# Patient Record
Sex: MALE | Race: WHITE | Employment: FULL TIME | URBAN - METROPOLITAN AREA
[De-identification: names, ages, dates, MRNs, and addresses within clinical notes are randomized per-mention and may not be internally consistent; named-entity substitution may affect disease eponyms.]

---

## 2018-11-30 ENCOUNTER — OFFICE VISIT (OUTPATIENT)
Dept: FAMILY MEDICINE CLINIC | Age: 53
End: 2018-11-30
Payer: COMMERCIAL

## 2018-11-30 VITALS
HEART RATE: 74 BPM | SYSTOLIC BLOOD PRESSURE: 92 MMHG | OXYGEN SATURATION: 98 % | WEIGHT: 159 LBS | DIASTOLIC BLOOD PRESSURE: 60 MMHG

## 2018-11-30 DIAGNOSIS — J37.0 LARYNGITIS, CHRONIC: Primary | ICD-10-CM

## 2018-11-30 PROCEDURE — 99213 OFFICE O/P EST LOW 20 MIN: CPT | Performed by: PHYSICIAN ASSISTANT

## 2018-11-30 ASSESSMENT — PATIENT HEALTH QUESTIONNAIRE - PHQ9
2. FEELING DOWN, DEPRESSED OR HOPELESS: 0
SUM OF ALL RESPONSES TO PHQ9 QUESTIONS 1 & 2: 0
1. LITTLE INTEREST OR PLEASURE IN DOING THINGS: 0
SUM OF ALL RESPONSES TO PHQ QUESTIONS 1-9: 0
SUM OF ALL RESPONSES TO PHQ QUESTIONS 1-9: 0

## 2018-11-30 ASSESSMENT — ENCOUNTER SYMPTOMS
EYE PAIN: 0
WHEEZING: 0
SORE THROAT: 0
VOICE CHANGE: 1
COUGH: 0

## 2019-07-12 ENCOUNTER — OFFICE VISIT (OUTPATIENT)
Dept: FAMILY MEDICINE CLINIC | Age: 54
End: 2019-07-12
Payer: COMMERCIAL

## 2019-07-12 VITALS
HEIGHT: 63 IN | DIASTOLIC BLOOD PRESSURE: 68 MMHG | WEIGHT: 157 LBS | HEART RATE: 64 BPM | BODY MASS INDEX: 27.82 KG/M2 | SYSTOLIC BLOOD PRESSURE: 110 MMHG | OXYGEN SATURATION: 98 %

## 2019-07-12 DIAGNOSIS — Z23 NEED FOR DIPHTHERIA-TETANUS-PERTUSSIS (TDAP) VACCINE: ICD-10-CM

## 2019-07-12 DIAGNOSIS — Z12.11 SCREEN FOR COLON CANCER: ICD-10-CM

## 2019-07-12 DIAGNOSIS — Z00.00 PREVENTATIVE HEALTH CARE: Primary | ICD-10-CM

## 2019-07-12 DIAGNOSIS — Z13.220 SCREENING FOR LIPID DISORDERS: ICD-10-CM

## 2019-07-12 DIAGNOSIS — Z12.5 SCREENING FOR PROSTATE CANCER: ICD-10-CM

## 2019-07-12 DIAGNOSIS — Z11.4 SCREENING FOR HIV (HUMAN IMMUNODEFICIENCY VIRUS): ICD-10-CM

## 2019-07-12 DIAGNOSIS — Z13.1 SCREENING FOR DIABETES MELLITUS: ICD-10-CM

## 2019-07-12 PROCEDURE — 90471 IMMUNIZATION ADMIN: CPT | Performed by: PHYSICIAN ASSISTANT

## 2019-07-12 PROCEDURE — 99396 PREV VISIT EST AGE 40-64: CPT | Performed by: PHYSICIAN ASSISTANT

## 2019-07-12 PROCEDURE — 90715 TDAP VACCINE 7 YRS/> IM: CPT | Performed by: PHYSICIAN ASSISTANT

## 2019-07-12 ASSESSMENT — PATIENT HEALTH QUESTIONNAIRE - PHQ9
SUM OF ALL RESPONSES TO PHQ QUESTIONS 1-9: 0
2. FEELING DOWN, DEPRESSED OR HOPELESS: 0
1. LITTLE INTEREST OR PLEASURE IN DOING THINGS: 0
SUM OF ALL RESPONSES TO PHQ QUESTIONS 1-9: 0
SUM OF ALL RESPONSES TO PHQ9 QUESTIONS 1 & 2: 0

## 2019-07-12 ASSESSMENT — ENCOUNTER SYMPTOMS
CONSTIPATION: 0
EYE PAIN: 0
TROUBLE SWALLOWING: 0
CHEST TIGHTNESS: 0
COUGH: 0
BACK PAIN: 0
VOICE CHANGE: 0
SORE THROAT: 0
SHORTNESS OF BREATH: 0
ABDOMINAL PAIN: 0
DIARRHEA: 0

## 2019-07-12 NOTE — PROGRESS NOTES
supple. No thyroid mass and no thyromegaly present. Cardiovascular: Normal rate, regular rhythm, S1 normal, S2 normal, normal heart sounds and normal pulses. PMI is not displaced. Pulmonary/Chest: Effort normal and breath sounds normal. He has no decreased breath sounds. He has no wheezes. He has no rhonchi. He has no rales. Abdominal: Soft. Normal appearance and bowel sounds are normal. There is no hepatosplenomegaly. There is no tenderness. There is no rebound, no guarding and no CVA tenderness. No hernia. Musculoskeletal: Normal range of motion. Cervical back: Normal.        Thoracic back: Normal.        Lumbar back: Normal.   Lymphadenopathy:     He has no cervical adenopathy. Neurological: He is alert and oriented to person, place, and time. He has normal strength. No cranial nerve deficit or sensory deficit. Gait normal.   Skin: Skin is warm, dry and intact. No rash noted. No cyanosis. Psychiatric: He has a normal mood and affect. His speech is normal and behavior is normal.       Assessment:      [facility] was seen today for annual exam.    Diagnoses and all orders for this visit:    Preventative health care    Need for diphtheria-tetanus-pertussis (Tdap) vaccine  -     Tdap (age 6y and older) IM ([address])    Screen for colon cancer  -     COLONOSCOPY (Screening)    Screening for lipid disorders  -     LIPID PANEL; Future    Screening for diabetes mellitus  -     Comprehensive Metabolic Panel    Screening for prostate cancer  -     Psa screening    Screening for HIV (human immunodeficiency virus)  -     HIV-1 AND HIV-2 ANTIBODIES; Future             Plan:      Get routine labs, colonoscopy, eye exam, tdap today, return in a year.         Jaquelin Grant

## 2019-08-12 ENCOUNTER — TELEPHONE (OUTPATIENT)
Dept: FAMILY MEDICINE CLINIC | Age: 54
End: 2019-08-12

## 2019-08-12 NOTE — TELEPHONE ENCOUNTER
LDL just a little high. Pt needs to cut down on fatty foods. Other labs are fine. Left message for patient to call back.

## 2019-10-01 ENCOUNTER — ANESTHESIA EVENT (OUTPATIENT)
Dept: ENDOSCOPY | Age: 54
End: 2019-10-01
Payer: COMMERCIAL

## 2019-10-25 ENCOUNTER — ANESTHESIA (OUTPATIENT)
Dept: ENDOSCOPY | Age: 54
End: 2019-10-25
Payer: COMMERCIAL

## 2019-10-25 ENCOUNTER — HOSPITAL ENCOUNTER (OUTPATIENT)
Age: 54
Setting detail: OUTPATIENT SURGERY
Discharge: HOME | End: 2019-10-25
Attending: INTERNAL MEDICINE | Admitting: INTERNAL MEDICINE
Payer: COMMERCIAL

## 2019-10-25 VITALS
RESPIRATION RATE: 16 BRPM | TEMPERATURE: 97.8 F | SYSTOLIC BLOOD PRESSURE: 119 MMHG | WEIGHT: 155 LBS | DIASTOLIC BLOOD PRESSURE: 79 MMHG | BODY MASS INDEX: 27.46 KG/M2 | HEIGHT: 63 IN | HEART RATE: 52 BPM | OXYGEN SATURATION: 100 %

## 2019-10-25 VITALS — SYSTOLIC BLOOD PRESSURE: 93 MMHG | OXYGEN SATURATION: 100 % | DIASTOLIC BLOOD PRESSURE: 67 MMHG

## 2019-10-25 PROCEDURE — 2500000003 HC RX 250 WO HCPCS: Performed by: NURSE ANESTHETIST, CERTIFIED REGISTERED

## 2019-10-25 PROCEDURE — 3700000001 HC ADD 15 MINUTES (ANESTHESIA): Performed by: INTERNAL MEDICINE

## 2019-10-25 PROCEDURE — 2709999900 HC NON-CHARGEABLE SUPPLY: Performed by: INTERNAL MEDICINE

## 2019-10-25 PROCEDURE — 3700000000 HC ANESTHESIA ATTENDED CARE: Performed by: INTERNAL MEDICINE

## 2019-10-25 PROCEDURE — 3609010600 HC COLONOSCOPY POLYPECTOMY SNARE/COLD BIOPSY: Performed by: INTERNAL MEDICINE

## 2019-10-25 PROCEDURE — 6370000000 HC RX 637 (ALT 250 FOR IP): Performed by: INTERNAL MEDICINE

## 2019-10-25 PROCEDURE — 6360000002 HC RX W HCPCS: Performed by: NURSE ANESTHETIST, CERTIFIED REGISTERED

## 2019-10-25 PROCEDURE — 7100000011 HC PHASE II RECOVERY - ADDTL 15 MIN: Performed by: INTERNAL MEDICINE

## 2019-10-25 PROCEDURE — 7100000010 HC PHASE II RECOVERY - FIRST 15 MIN: Performed by: INTERNAL MEDICINE

## 2019-10-25 PROCEDURE — 2580000003 HC RX 258: Performed by: ANESTHESIOLOGY

## 2019-10-25 RX ORDER — LABETALOL HYDROCHLORIDE 5 MG/ML
5 INJECTION, SOLUTION INTRAVENOUS EVERY 10 MIN PRN
Status: DISCONTINUED | OUTPATIENT
Start: 2019-10-25 | End: 2019-10-25 | Stop reason: HOSPADM

## 2019-10-25 RX ORDER — SODIUM CHLORIDE 0.9 % (FLUSH) 0.9 %
10 SYRINGE (ML) INJECTION PRN
Status: DISCONTINUED | OUTPATIENT
Start: 2019-10-25 | End: 2019-10-25 | Stop reason: HOSPADM

## 2019-10-25 RX ORDER — PROMETHAZINE HYDROCHLORIDE 25 MG/ML
6.25 INJECTION, SOLUTION INTRAMUSCULAR; INTRAVENOUS
Status: DISCONTINUED | OUTPATIENT
Start: 2019-10-25 | End: 2019-10-25 | Stop reason: HOSPADM

## 2019-10-25 RX ORDER — PROPOFOL 10 MG/ML
INJECTION, EMULSION INTRAVENOUS PRN
Status: DISCONTINUED | OUTPATIENT
Start: 2019-10-25 | End: 2019-10-25 | Stop reason: SDUPTHER

## 2019-10-25 RX ORDER — LIDOCAINE HYDROCHLORIDE 10 MG/ML
1 INJECTION, SOLUTION EPIDURAL; INFILTRATION; INTRACAUDAL; PERINEURAL
Status: DISCONTINUED | OUTPATIENT
Start: 2019-10-25 | End: 2019-10-25 | Stop reason: HOSPADM

## 2019-10-25 RX ORDER — LIDOCAINE HYDROCHLORIDE 20 MG/ML
INJECTION, SOLUTION EPIDURAL; INFILTRATION; INTRACAUDAL; PERINEURAL PRN
Status: DISCONTINUED | OUTPATIENT
Start: 2019-10-25 | End: 2019-10-25 | Stop reason: SDUPTHER

## 2019-10-25 RX ORDER — SODIUM CHLORIDE 0.9 % (FLUSH) 0.9 %
10 SYRINGE (ML) INJECTION EVERY 12 HOURS SCHEDULED
Status: DISCONTINUED | OUTPATIENT
Start: 2019-10-25 | End: 2019-10-25 | Stop reason: HOSPADM

## 2019-10-25 RX ORDER — ONDANSETRON 2 MG/ML
4 INJECTION INTRAMUSCULAR; INTRAVENOUS
Status: DISCONTINUED | OUTPATIENT
Start: 2019-10-25 | End: 2019-10-25 | Stop reason: HOSPADM

## 2019-10-25 RX ORDER — SODIUM CHLORIDE 9 MG/ML
INJECTION, SOLUTION INTRAVENOUS CONTINUOUS
Status: DISCONTINUED | OUTPATIENT
Start: 2019-10-25 | End: 2019-10-25 | Stop reason: HOSPADM

## 2019-10-25 RX ADMIN — PROPOFOL 120 MG: 10 INJECTION, EMULSION INTRAVENOUS at 08:10

## 2019-10-25 RX ADMIN — PROPOFOL 40 MG: 10 INJECTION, EMULSION INTRAVENOUS at 08:17

## 2019-10-25 RX ADMIN — LIDOCAINE HYDROCHLORIDE 60 MG: 20 INJECTION, SOLUTION EPIDURAL; INFILTRATION; INTRACAUDAL; PERINEURAL at 08:10

## 2019-10-25 RX ADMIN — LIDOCAINE HYDROCHLORIDE 20 MG: 20 INJECTION, SOLUTION EPIDURAL; INFILTRATION; INTRACAUDAL; PERINEURAL at 08:13

## 2019-10-25 RX ADMIN — SODIUM CHLORIDE: 9 INJECTION, SOLUTION INTRAVENOUS at 07:18

## 2019-10-25 RX ADMIN — PROPOFOL 40 MG: 10 INJECTION, EMULSION INTRAVENOUS at 08:13

## 2019-10-25 ASSESSMENT — PAIN - FUNCTIONAL ASSESSMENT: PAIN_FUNCTIONAL_ASSESSMENT: 0-10

## 2019-10-25 ASSESSMENT — PAIN SCALES - GENERAL: PAINLEVEL_OUTOF10: 0

## 2020-06-04 ENCOUNTER — TELEPHONE (OUTPATIENT)
Dept: FAMILY MEDICINE CLINIC | Age: 55
End: 2020-06-04

## 2020-06-09 ENCOUNTER — TELEPHONE (OUTPATIENT)
Dept: FAMILY MEDICINE CLINIC | Age: 55
End: 2020-06-09

## 2020-06-16 ENCOUNTER — OFFICE VISIT (OUTPATIENT)
Dept: FAMILY MEDICINE CLINIC | Age: 55
End: 2020-06-16
Payer: COMMERCIAL

## 2020-06-16 VITALS
HEART RATE: 98 BPM | OXYGEN SATURATION: 98 % | WEIGHT: 160 LBS | HEIGHT: 63 IN | SYSTOLIC BLOOD PRESSURE: 112 MMHG | DIASTOLIC BLOOD PRESSURE: 84 MMHG | BODY MASS INDEX: 28.35 KG/M2 | TEMPERATURE: 97.7 F

## 2020-06-16 PROCEDURE — 93000 ELECTROCARDIOGRAM COMPLETE: CPT | Performed by: PHYSICIAN ASSISTANT

## 2020-06-16 PROCEDURE — 99213 OFFICE O/P EST LOW 20 MIN: CPT | Performed by: PHYSICIAN ASSISTANT

## 2020-06-16 RX ORDER — NAPROXEN 500 MG/1
500 TABLET ORAL 2 TIMES DAILY WITH MEALS
COMMUNITY
Start: 2015-04-18 | End: 2022-01-28

## 2020-06-16 ASSESSMENT — PATIENT HEALTH QUESTIONNAIRE - PHQ9
SUM OF ALL RESPONSES TO PHQ9 QUESTIONS 1 & 2: 0
SUM OF ALL RESPONSES TO PHQ QUESTIONS 1-9: 0
2. FEELING DOWN, DEPRESSED OR HOPELESS: 0
SUM OF ALL RESPONSES TO PHQ QUESTIONS 1-9: 0
1. LITTLE INTEREST OR PLEASURE IN DOING THINGS: 0

## 2020-06-16 NOTE — PROGRESS NOTES
Preoperative Consultation    Jerod Torres  YOB: 1965    This patient presents to the office today for a preoperative consultation at the request of surgeon, Dr. Sammy Gan, who plans on performing right shoulder arthroscopy on  at [facility].  He injured his right shoulder at work on 19. Was throwing a tv away and his arms were stretched out, it was a large tv. He went to urgent care first. Then he had to do PT. After 4 sessions, he said the pain was getting worse. He got an MRI and then saw orthopedics. Had rotator cuff tear and biceps tendonitis. It has taken this long due to Pitney Mathew. He has no significant medical problems that would hinder him from having surgery. He has had no recent illnesses. Planned anesthesia: General   Known anesthesia problems: None   Bleeding risk: No recent or remote history of abnormal bleeding  Personal or FH of DVT/PE: No      There is no problem list on file for this patient.     Past Surgical History:   Procedure Laterality Date    COLONOSCOPY N/A 10/25/2019    COLONOSCOPY POLYPECTOMY SNARE/COLD BIOPSY performed by Chary Morris MD at [address] [place] [address]       No Known Allergies  Outpatient Medications Marked as Taking for the 20 encounter (Office Visit) with JESIKA Wolff   Medication Sig Dispense Refill    naproxen (NAPROSYN) 500 MG tablet Take 500 mg by mouth 2 times daily (with meals)      Multiple Vitamins-Minerals (MULTIVITAMIN ADULT PO) Take 1 tablet by mouth daily         Social History     Tobacco Use    Smoking status: Former Smoker     Last attempt to quit: 1994     Years since quittin.4    Smokeless tobacco: Current User     Types: Snuff   Substance Use Topics    Alcohol use: No     Family History   Problem Relation Age of Onset    Diabetes Mother     Cancer Father         colon    Diabetes Father     Diabetes Brother        Review of Systems  A

## 2020-09-15 ENCOUNTER — OFFICE VISIT (OUTPATIENT)
Dept: FAMILY MEDICINE CLINIC | Age: 55
End: 2020-09-15
Payer: COMMERCIAL

## 2020-09-15 VITALS
TEMPERATURE: 98.1 F | HEART RATE: 66 BPM | SYSTOLIC BLOOD PRESSURE: 124 MMHG | BODY MASS INDEX: 29.16 KG/M2 | WEIGHT: 162 LBS | DIASTOLIC BLOOD PRESSURE: 81 MMHG

## 2020-09-15 PROCEDURE — 90471 IMMUNIZATION ADMIN: CPT | Performed by: PHYSICIAN ASSISTANT

## 2020-09-15 PROCEDURE — 90686 IIV4 VACC NO PRSV 0.5 ML IM: CPT | Performed by: PHYSICIAN ASSISTANT

## 2020-09-15 PROCEDURE — 99396 PREV VISIT EST AGE 40-64: CPT | Performed by: PHYSICIAN ASSISTANT

## 2020-09-15 RX ORDER — NAPROXEN 500 MG/1
500 TABLET ORAL 2 TIMES DAILY PRN
COMMUNITY
End: 2020-09-15

## 2020-09-15 RX ORDER — ACETAMINOPHEN 500 MG
500 TABLET ORAL EVERY 6 HOURS PRN
COMMUNITY
End: 2022-01-28 | Stop reason: ALTCHOICE

## 2020-09-15 RX ORDER — OXYCODONE HYDROCHLORIDE AND ACETAMINOPHEN 5; 325 MG/1; MG/1
TABLET ORAL
COMMUNITY
Start: 2020-06-29 | End: 2020-09-15 | Stop reason: ALTCHOICE

## 2020-09-15 ASSESSMENT — ENCOUNTER SYMPTOMS
TROUBLE SWALLOWING: 0
COUGH: 0
ABDOMINAL PAIN: 0
VOICE CHANGE: 0
CONSTIPATION: 0
EYE PAIN: 0
DIARRHEA: 0
SHORTNESS OF BREATH: 0
SORE THROAT: 0
BACK PAIN: 0
CHEST TIGHTNESS: 0

## 2020-09-15 NOTE — PROGRESS NOTES
Vaccine Information Sheet, \"Influenza - Inactivated\"  given to Soledad Bishop, or parent/legal guardian of  Soledad Bishop and verbalized understanding. Patient responses:    Have you ever had a reaction to a flu vaccine? No  Do you have any current illness? No  Have you ever had Guillian Vernon Syndrome? No  Do you have a serious allergy to any of the follow: Neomycin, Polymyxin, Thimerosal, eggs or egg products? No    Flu vaccine given per order. Please see immunization tab. Risks and benefits explained. Current VIS given.

## 2020-09-15 NOTE — PROGRESS NOTES
Subjective:      Patient ID: Brandi Meeks is a 50 y.o. male. HPI Patient is here today for his annual physical. No complaints. He had biceps tendon repair 6/29 and still doing PT. He has not had his labs done. He sleeps well, good appetite. He has no bowel/bladder issues. He does exercise daily with his job. He admits to an unhealthy. Tdap in 2019. He is current with dental exam. He does not have any eye issues. He would like a flu shot today. He is current with colonoscopy, due in 2029. Review of Systems   Constitutional: Negative for appetite change, fatigue and unexpected weight change. HENT: Negative for congestion, dental problem, ear pain, hearing loss, sore throat, trouble swallowing and voice change. Eyes: Negative for pain and visual disturbance. Respiratory: Negative for cough, chest tightness and shortness of breath. Cardiovascular: Negative for chest pain and palpitations. Gastrointestinal: Negative for abdominal pain, constipation and diarrhea. Genitourinary: Negative for difficulty urinating. Musculoskeletal: Negative for arthralgias, back pain, myalgias and neck pain. Skin: Negative for rash. Neurological: Negative for dizziness, speech difficulty, weakness, numbness and headaches. Hematological: Negative for adenopathy. Psychiatric/Behavioral: Negative for confusion and sleep disturbance. The patient is not nervous/anxious. Objective:   Physical Exam  Vitals signs reviewed. Constitutional:       Appearance: Normal appearance. He is well-developed and well-groomed. HENT:      Head: Normocephalic. Right Ear: Tympanic membrane and ear canal normal.      Left Ear: Tympanic membrane and ear canal normal.      Nose: Nose normal.      Mouth/Throat:      Pharynx: Oropharynx is clear. Uvula midline. Eyes:      Conjunctiva/sclera: Conjunctivae normal.      Pupils: Pupils are equal, round, and reactive to light.    Neck:      Musculoskeletal: Normal range of motion and neck supple. No muscular tenderness. Thyroid: No thyromegaly. Trachea: Trachea normal.   Cardiovascular:      Rate and Rhythm: Normal rate and regular rhythm. Chest Wall: PMI is not displaced. Pulses: Normal pulses. Heart sounds: Normal heart sounds. Pulmonary:      Effort: Pulmonary effort is normal.      Breath sounds: Normal breath sounds. Abdominal:      General: Abdomen is flat. Bowel sounds are normal.      Palpations: Abdomen is soft. Tenderness: There is no abdominal tenderness. There is no guarding or rebound. Hernia: No hernia is present. Musculoskeletal: Normal range of motion. Cervical back: Normal.      Thoracic back: Normal.      Lumbar back: Normal.      Comments: Other than right arm due to recent surgery   Lymphadenopathy:      Cervical: No cervical adenopathy. Skin:     General: Skin is warm and dry. Findings: No rash. Neurological:      Mental Status: He is alert and oriented to person, place, and time. Cranial Nerves: No cranial nerve deficit. Sensory: No sensory deficit. Gait: Gait normal.   Psychiatric:         Attention and Perception: Attention normal.         Mood and Affect: Mood normal.         Speech: Speech normal.         Behavior: Behavior normal. Behavior is cooperative. Assessment:      Eric Coffey was seen today for annual exam.    Diagnoses and all orders for this visit:    Preventative health care    Need for influenza vaccination  -     INFLUENZA, QUADV, 3 YRS AND OLDER, IM PF, PREFILL SYR OR SDV, 0.5ML (AFLURIA QUADV, PF)    Screening, lipid  -     LIPID PANEL; Future    Screening for diabetes mellitus  -     Comprehensive Metabolic Panel    Screening for prostate cancer  -     Psa screening             Plan:      Get routine labs, flu shot today, return here in a year.         Nigel Arzate, [employer]

## 2020-09-15 NOTE — PATIENT INSTRUCTIONS
[address] [facility] was seen today for annual exam.    Diagnoses and all orders for this visit:    Preventative health care    Need for influenza vaccination  -     INFLUENZA, QUADV, 3 YRS AND OLDER, IM PF, PREFILL SYR OR SDV, 0.5ML (AFLURIA QUADV, PF)    Screening, lipid  -     LIPID PANEL; Future    Screening for diabetes mellitus  -     Comprehensive Metabolic Panel    Screening for prostate cancer  -     Psa screening       Get routine labs, flu shot today, return in a year.

## 2022-01-28 ENCOUNTER — OFFICE VISIT (OUTPATIENT)
Dept: FAMILY MEDICINE CLINIC | Age: 57
End: 2022-01-28
Payer: COMMERCIAL

## 2022-01-28 VITALS
DIASTOLIC BLOOD PRESSURE: 82 MMHG | OXYGEN SATURATION: 97 % | SYSTOLIC BLOOD PRESSURE: 122 MMHG | BODY MASS INDEX: 28.17 KG/M2 | WEIGHT: 159 LBS | HEART RATE: 81 BPM | HEIGHT: 63 IN | TEMPERATURE: 98.5 F

## 2022-01-28 DIAGNOSIS — Z00.00 PREVENTATIVE HEALTH CARE: Primary | ICD-10-CM

## 2022-01-28 DIAGNOSIS — Z13.220 SCREENING, LIPID: ICD-10-CM

## 2022-01-28 DIAGNOSIS — Z13.1 SCREENING FOR DIABETES MELLITUS: ICD-10-CM

## 2022-01-28 DIAGNOSIS — Z12.5 SCREENING FOR PROSTATE CANCER: ICD-10-CM

## 2022-01-28 DIAGNOSIS — M25.532 LEFT WRIST PAIN: ICD-10-CM

## 2022-01-28 PROCEDURE — 99396 PREV VISIT EST AGE 40-64: CPT | Performed by: PHYSICIAN ASSISTANT

## 2022-01-28 SDOH — ECONOMIC STABILITY: FOOD INSECURITY: WITHIN THE PAST 12 MONTHS, THE FOOD YOU BOUGHT JUST DIDN'T LAST AND YOU DIDN'T HAVE MONEY TO GET MORE.: NEVER TRUE

## 2022-01-28 SDOH — ECONOMIC STABILITY: FOOD INSECURITY: WITHIN THE PAST 12 MONTHS, YOU WORRIED THAT YOUR FOOD WOULD RUN OUT BEFORE YOU GOT MONEY TO BUY MORE.: NEVER TRUE

## 2022-01-28 ASSESSMENT — SOCIAL DETERMINANTS OF HEALTH (SDOH): HOW HARD IS IT FOR YOU TO PAY FOR THE VERY BASICS LIKE FOOD, HOUSING, MEDICAL CARE, AND HEATING?: NOT HARD AT ALL

## 2022-01-28 ASSESSMENT — ENCOUNTER SYMPTOMS
DIARRHEA: 0
SORE THROAT: 0
EYE PAIN: 0
TROUBLE SWALLOWING: 0
CONSTIPATION: 0
SHORTNESS OF BREATH: 0
CHEST TIGHTNESS: 0
VOICE CHANGE: 0
ABDOMINAL PAIN: 0
BACK PAIN: 0
COUGH: 0

## 2022-01-28 ASSESSMENT — PATIENT HEALTH QUESTIONNAIRE - PHQ9
SUM OF ALL RESPONSES TO PHQ9 QUESTIONS 1 & 2: 0
SUM OF ALL RESPONSES TO PHQ QUESTIONS 1-9: 0
2. FEELING DOWN, DEPRESSED OR HOPELESS: 0
SUM OF ALL RESPONSES TO PHQ QUESTIONS 1-9: 0
1. LITTLE INTEREST OR PLEASURE IN DOING THINGS: 0
DEPRESSION UNABLE TO ASSESS: FUNCTIONAL CAPACITY MOTIVATION LIMITS ACCURACY

## 2022-01-28 NOTE — PROGRESS NOTES
Subjective:      Patient ID: Leslie Leiva is a 49 y.o. male. HPI  Patient is here today for his yearly exam.     He is due for his yearly labs. His wife wants him to get checked for prostate cancer, which I normally. He has had 3 Covid vaccines. He did not have a flu shot yet. Tdap is current. He is sleeping well. He has a good appetite. He does not eat healthy sometimes. He has no bowel/bladder issues. He works at [employer]. He gets a lot of exercise. Last colonoscopy was in 2019. Not sure when due again. He gets routine dental exams. Last eye exam was a long time ago. Review of Systems   Constitutional: Negative for appetite change, fatigue and unexpected weight change. HENT: Negative for congestion, dental problem, ear pain, hearing loss, sore throat, trouble swallowing and voice change. Eyes: Negative for pain and visual disturbance. Respiratory: Negative for cough, chest tightness and shortness of breath. Cardiovascular: Negative for chest pain and palpitations. Gastrointestinal: Negative for abdominal pain, constipation and diarrhea. Genitourinary: Negative for difficulty urinating. Musculoskeletal: Negative for arthralgias, back pain, myalgias and neck pain. Skin: Negative for rash. Neurological: Negative for dizziness, speech difficulty, weakness, numbness and headaches. Hematological: Negative for adenopathy. Psychiatric/Behavioral: Negative for confusion and sleep disturbance. The patient is not nervous/anxious. Objective:   Physical Exam  Vitals reviewed. Constitutional:       Appearance: Normal appearance. He is well-developed and well-groomed. HENT:      Head: Normocephalic. Right Ear: Tympanic membrane and ear canal normal.      Left Ear: Tympanic membrane and ear canal normal.      Nose: Nose normal.      Mouth/Throat:      Pharynx: Oropharynx is clear. Uvula midline.    Eyes:      Conjunctiva/sclera: Conjunctivae normal.      Pupils: Pupils are equal, round, and reactive to light. Neck:      Thyroid: No thyroid mass or thyromegaly. Trachea: Trachea normal.   Cardiovascular:      Rate and Rhythm: Normal rate and regular rhythm. Chest Wall: PMI is not displaced. Pulses: Normal pulses. Heart sounds: Normal heart sounds, S1 normal and S2 normal.   Pulmonary:      Effort: Pulmonary effort is normal.      Breath sounds: Normal breath sounds. Abdominal:      General: Abdomen is flat. Bowel sounds are normal.      Palpations: Abdomen is soft. Tenderness: There is no abdominal tenderness. There is no guarding or rebound. Hernia: No hernia is present. Musculoskeletal:      Cervical back: Normal range of motion and neck supple. Thoracic back: Normal.      Lumbar back: Normal.      Right hip: Normal.      Left hip: Normal.      Comments: Nontender to palpate left wrist or hand but extremely limited ROM of left wrist, decreased  strength with heavy things, negative tinels and phalens   Lymphadenopathy:      Cervical: No cervical adenopathy. Skin:     General: Skin is warm and dry. Findings: No rash. Neurological:      Mental Status: He is alert and oriented to person, place, and time. Cranial Nerves: No cranial nerve deficit. Sensory: No sensory deficit. Gait: Gait normal.   Psychiatric:         Attention and Perception: Attention normal.         Mood and Affect: Mood normal.         Speech: Speech normal.         Behavior: Behavior normal. Behavior is cooperative. Assessment:      Juve Rojas was seen today for annual exam.    Diagnoses and all orders for this visit:    Preventative health care    Screening, lipid  -     LIPID PANEL; Future    Screening for diabetes mellitus  -     Comprehensive Metabolic Panel    Screening for prostate cancer  -     PSA, Prostatic Specific Antigen; Future    Left wrist pain  -     XR WRIST LEFT (MIN 3 VIEWS);  Future             Plan:      Get routine labs,

## 2022-01-28 NOTE — PATIENT INSTRUCTIONS
Ruth Ann Nj was seen today for annual exam.    Diagnoses and all orders for this visit:    Preventative health care    Screening, lipid  -     LIPID PANEL; Future    Screening for diabetes mellitus  -     Comprehensive Metabolic Panel    Screening for prostate cancer  -     PSA, Prostatic Specific Antigen; Future    Left wrist pain  -     XR WRIST LEFT (MIN 3 VIEWS); Future          Get routine labs, eye exam, xrays, return here in 1 year or sooner if needed.

## 2023-02-03 ENCOUNTER — OFFICE VISIT (OUTPATIENT)
Dept: FAMILY MEDICINE CLINIC | Age: 58
End: 2023-02-03

## 2023-02-03 VITALS
TEMPERATURE: 97.6 F | HEART RATE: 67 BPM | SYSTOLIC BLOOD PRESSURE: 120 MMHG | DIASTOLIC BLOOD PRESSURE: 82 MMHG | HEIGHT: 63 IN | BODY MASS INDEX: 27.85 KG/M2 | WEIGHT: 157.2 LBS | OXYGEN SATURATION: 99 %

## 2023-02-03 DIAGNOSIS — Z13.0 SCREENING FOR DEFICIENCY ANEMIA: ICD-10-CM

## 2023-02-03 DIAGNOSIS — Z00.00 ANNUAL PHYSICAL EXAM: Primary | ICD-10-CM

## 2023-02-03 DIAGNOSIS — Z87.891 HISTORY OF SMOKELESS TOBACCO USE: ICD-10-CM

## 2023-02-03 DIAGNOSIS — L98.9 SCALP LESION: ICD-10-CM

## 2023-02-03 DIAGNOSIS — Z13.29 SCREENING FOR HYPOTHYROIDISM: ICD-10-CM

## 2023-02-03 DIAGNOSIS — E78.41 ELEVATED LIPOPROTEIN(A): ICD-10-CM

## 2023-02-03 DIAGNOSIS — K13.70 ORAL LESION: ICD-10-CM

## 2023-02-03 DIAGNOSIS — Z12.5 SCREENING FOR MALIGNANT NEOPLASM OF PROSTATE: ICD-10-CM

## 2023-02-03 DIAGNOSIS — Z13.1 SCREENING FOR DIABETES MELLITUS: ICD-10-CM

## 2023-02-03 SDOH — ECONOMIC STABILITY: HOUSING INSECURITY
IN THE LAST 12 MONTHS, WAS THERE A TIME WHEN YOU DID NOT HAVE A STEADY PLACE TO SLEEP OR SLEPT IN A SHELTER (INCLUDING NOW)?: NO

## 2023-02-03 SDOH — ECONOMIC STABILITY: INCOME INSECURITY: HOW HARD IS IT FOR YOU TO PAY FOR THE VERY BASICS LIKE FOOD, HOUSING, MEDICAL CARE, AND HEATING?: NOT HARD AT ALL

## 2023-02-03 SDOH — ECONOMIC STABILITY: FOOD INSECURITY: WITHIN THE PAST 12 MONTHS, YOU WORRIED THAT YOUR FOOD WOULD RUN OUT BEFORE YOU GOT MONEY TO BUY MORE.: NEVER TRUE

## 2023-02-03 SDOH — ECONOMIC STABILITY: FOOD INSECURITY: WITHIN THE PAST 12 MONTHS, THE FOOD YOU BOUGHT JUST DIDN'T LAST AND YOU DIDN'T HAVE MONEY TO GET MORE.: NEVER TRUE

## 2023-02-03 ASSESSMENT — PATIENT HEALTH QUESTIONNAIRE - PHQ9
SUM OF ALL RESPONSES TO PHQ QUESTIONS 1-9: 0
SUM OF ALL RESPONSES TO PHQ QUESTIONS 1-9: 0
1. LITTLE INTEREST OR PLEASURE IN DOING THINGS: 0
SUM OF ALL RESPONSES TO PHQ9 QUESTIONS 1 & 2: 0
SUM OF ALL RESPONSES TO PHQ QUESTIONS 1-9: 0
SUM OF ALL RESPONSES TO PHQ QUESTIONS 1-9: 0
2. FEELING DOWN, DEPRESSED OR HOPELESS: 0

## 2023-02-03 NOTE — PROGRESS NOTES
History and Physical      Leeroy Norwood  YOB: 1965    Date of Service:  2/3/2023    Chief Complaint:   Leeroy Norwood is a 56 y.o. male who presents for complete physical examination. HPI: Presents to clinic today for annual physical exam.  Previous Cheryln Hammans patient. Not treated for any chronic health conditions. No acute concerns. Has recently stopped using smokeless tobacco.  Has been successful in stopping over the past week. He did have a recent dental appointment and there was some concern about the pocketing of his oral tissue from where he held his smokeless tobacco. They wanted to watch the area for the next 6 weeks to see if it improved. Diet: Poor - doesn't eat fruits and vegetables daily. Doesn't limit junk and sugar. Exercise: no regular exercise - stays active at work. 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 Enjoys jobs in general.  [employer]nie [employer] co-workers. Hobbies: Motorcycle, corn hole. Family life: , 1 child, 3 step children, 6 grandchildren - gets to spend time with them. Patients daughter lives in [place] - gets to visit on occasion. 1 dog, lives in house. Low stress. No concerns for anxiety or depression. Last eye exam: Unsure - wears reading glasses   Last dental exam: 2023    Preventive Care:  Health Maintenance   Topic Date Due    Shingles vaccine (1 of 2) Never done    COVID-19 Vaccine (4 - Booster for Pfizer series) 02/20/2022    Flu vaccine (1) 08/01/2022    Depression Screen  01/28/2023    Lipids  02/19/2027    DTaP/Tdap/Td vaccine (3 - Td or Tdap) 07/12/2029    Colorectal Cancer Screen  10/25/2029    Hepatitis C screen  Completed    HIV screen  Completed    Hepatitis A vaccine  Aged Out    Hib vaccine  Aged Out    Meningococcal (ACWY) vaccine  Aged Out    Pneumococcal 0-64 years Vaccine  Aged Out        Family History:  Colon Cancer: Father - dx in his late 45s.    Thyroid Cancer/Disease: None  Melanoma: None  Prostate Cancer: None  Testicular Cancer: None       Preventive plan of care for Audrey Love        2/3/2023           Preventive Measures Status       Recommendations for screening   Prostate Cancer Screen  Lab Results   Component Value Date    PSA 1.7 02/19/2022      Test recommended and ordered    Colon Cancer Screen  Last colonoscopy: 2019 Repeat in 5 years   Diabetes Screen  Glucose (mg/dL)   Date Value   02/19/2022 88    Test recommended and ordered   Cholesterol Screen  Lab Results   Component Value Date    CHOL 185 02/19/2022    TRIG 155 (H) 02/19/2022    HDL 43 02/19/2022    LDLCALC 115 (H) 02/19/2022    Test recommended and ordered   Aspirin for Cardiovascular Prevention   No Not indicated   Weight: Body mass index is 27.63 kg/m². 5' 3.25\" (1.607 m)157 lb 3.2 oz (71.3 kg)  Your BMI is between 18.5 and 24.9, which indicates that you are at a healthy weight   Living Will: No Additional information provided    Recommended Immunizations    Immunization History   Administered Date(s) Administered    COVID-19, PFIZER PURPLE top, DILUTE for use, (age 15 y+), 30mcg/0.3mL 04/01/2021, 04/29/2021, 12/26/2021    Influenza, FLUARIX, FLULAVAL, FLUZONE (age 10 mo+) AND AFLURIA, (age 1 y+), PF, 0.5mL 09/15/2020    Tdap (Boostrix, Adacel) 09/05/2012, 07/12/2019    Influenza vaccine:  recommended every fall              Wt Readings from Last 3 Encounters:   02/03/23 157 lb 3.2 oz (71.3 kg)   01/28/22 159 lb (72.1 kg)   09/15/20 162 lb (73.5 kg)     BP Readings from Last 3 Encounters:   02/03/23 120/82   01/28/22 122/82   09/15/20 124/81     There is no problem list on file for this patient.     Allergies   Allergen Reactions    Other     Percocet [Oxycodone-Acetaminophen] Itching     Outpatient Medications Marked as Taking for the 2/3/23 encounter (Office Visit) with RICARDO Torres CNP   Medication Sig Dispense Refill    Multiple Vitamins-Minerals (MULTIVITAMIN ADULT PO) Take 1 tablet by mouth daily       No past medical history on file. Past Surgical History:   Procedure Laterality Date    ARM SURGERY  2020    right bicep repair    COLONOSCOPY N/A 10/25/2019    COLONOSCOPY POLYPECTOMY SNARE/COLD BIOPSY performed by Francesco Patel MD at [address]      inguinal     Family History   Problem Relation Age of Onset    Diabetes Mother     Cancer Father         colon    Diabetes Father     Diabetes Brother      Social History     Socioeconomic History    Marital status:      Spouse name: Not on file    Number of children: Not on file    Years of education: Not on file    Highest education level: Not on file   Occupational History    Not on file   Tobacco Use    Smoking status: Former     Packs/day: 1.00     Years: 5.00     Pack years: 5.00     Types: Cigarettes     Quit date: 1994     Years since quittin.1    Smokeless tobacco: Current     Types: Snuff   Vaping Use    Vaping Use: Never used   Substance and Sexual Activity    Alcohol use: No    Drug use: No    Sexual activity: Yes   Other Topics Concern    Not on file   Social History Narrative    Not on file     Social Determinants of Health     Financial Resource Strain: Not on file   Food Insecurity: Not on file   Transportation Needs: Not on file   Physical Activity: Not on file   Stress: Not on file   Social Connections: Not on file   Intimate Partner Violence: Not on file   Housing Stability: Not on file     Review of Systems:  A comprehensive review of systems was negative except for what was noted in the HPI. Physical Exam:   Vitals:    23 1400   BP: 120/82   Site: Left Upper Arm   Position: Sitting   Cuff Size: Medium Adult   Pulse: 67   Temp: 97.6 °F (36.4 °C)   SpO2: 99%   Weight: 157 lb 3.2 oz (71.3 kg)   Height: 5' 3.25\" (1.607 m)     Body mass index is 27.63 kg/m². Physical Exam  Constitutional:       General: He is not in acute distress.      Appearance: Normal appearance. He is well-developed. HENT:      Head: Normocephalic and atraumatic. Comments: Scattered areas of scabbing/scaling/flaking. Right Ear: Hearing, tympanic membrane, ear canal and external ear normal.      Left Ear: Hearing, tympanic membrane, ear canal and external ear normal.      Nose: Nose normal. No mucosal edema. Mouth/Throat:      Pharynx: Uvula midline. Tonsils: No tonsillar exudate. 1+ on the right. 1+ on the left. Eyes:      General: Lids are normal.         Right eye: No discharge. Left eye: No discharge. Conjunctiva/sclera: Conjunctivae normal.      Pupils: Pupils are equal, round, and reactive to light. Neck:      Thyroid: No thyromegaly. Trachea: Trachea normal. No tracheal deviation. Cardiovascular:      Rate and Rhythm: Normal rate and regular rhythm. Heart sounds: Normal heart sounds, S1 normal and S2 normal.   Pulmonary:      Effort: Pulmonary effort is normal. No respiratory distress. Breath sounds: Normal breath sounds. Abdominal:      General: Bowel sounds are normal. There is no distension. Palpations: Abdomen is soft. Tenderness: There is no abdominal tenderness. There is no guarding. Musculoskeletal:         General: Normal range of motion. Cervical back: Normal range of motion. Lymphadenopathy:      Cervical: No cervical adenopathy. Skin:     General: Skin is warm and dry. Neurological:      Mental Status: He is alert and oriented to person, place, and time. Psychiatric:         Thought Content: Thought content normal.         Judgment: Judgment normal.      Assessment/Plan:    Other Recommendations   See a dentist every 6 months  Try to get at least 30 minutes of exercise 3-4 days per week  Always wear a seat belt when traveling in a car       1. Annual physical exam  Encouraged healthy diet and regular exercise. Due for routine labs. Follow up in 1 year for annual exam - sooner if needed.    - CBC with Auto Differential; Future  - Comprehensive Metabolic Panel, Fasting; Future  - Lipid, Fasting; Future  - TSH with Reflex; Future    2. Elevated lipoprotein(a)  Complete routine blood work. Will adjust treatment plan if needed. - CBC with Auto Differential; Future  - Comprehensive Metabolic Panel, Fasting; Future  - Lipid, Fasting; Future  - TSH with Reflex; Future    3. Oral lesion  Monitor area for the next few weeks. If no improvement follow up with ENT. - [facility] [address] [facility]    4. History of smokeless tobacco use  - [facility] [address] [facility]    5. Scalp lesion  May consider follow up with Dermatology. 6. Screening for diabetes mellitus  - Comprehensive Metabolic Panel, Fasting; Future    7. Screening for hypothyroidism  - TSH with Reflex; Future    8. Screening for deficiency anemia  - CBC with Auto Differential; Future    9. Screening for malignant neoplasm of prostate  - PSA Screening; Future  Return in about 1 year (around 2/3/2024) for annual exam, chronic health conditions.

## 2023-03-03 ENCOUNTER — OFFICE VISIT (OUTPATIENT)
Dept: ENT CLINIC | Age: 58
End: 2023-03-03
Payer: COMMERCIAL

## 2023-03-03 VITALS
OXYGEN SATURATION: 96 % | DIASTOLIC BLOOD PRESSURE: 74 MMHG | TEMPERATURE: 97.8 F | HEART RATE: 94 BPM | SYSTOLIC BLOOD PRESSURE: 112 MMHG

## 2023-03-03 DIAGNOSIS — K13.21 ORAL LEUKOPLAKIA: Primary | ICD-10-CM

## 2023-03-03 DIAGNOSIS — Z87.891 FORMER SMOKELESS TOBACCO USE: ICD-10-CM

## 2023-03-03 PROCEDURE — 99203 OFFICE O/P NEW LOW 30 MIN: CPT | Performed by: STUDENT IN AN ORGANIZED HEALTH CARE EDUCATION/TRAINING PROGRAM

## 2023-03-03 RX ORDER — ASCORBIC ACID 500 MG
500 TABLET ORAL DAILY
COMMUNITY

## 2023-03-03 NOTE — PROGRESS NOTES
[address] [facility] SURGERY  NEW PATIENT HISTORY AND PHYSICAL NOTE      Patient Name: [address] [facility] [address] Record Number:  [id number]  Primary Care Physician:  RICARDO Shaffer CNP    ChiefComplaint     Chief Complaint   Patient presents with    Other     Oral lesion,        History of Present Illness     Bonnie Calderon is an 56 y.o. male presenting with oral lesion. He was using \"dipping\" tobacco since age 17. He stopped approximately 1 month ago when his dentist noted an oral lesion. Since then he has been vaping. In the past he would put his tobacco on the left side of his lip. He would do this all day every day. No associated oral pain or bleeding in his mouth. Denies sore throat. Past Medical History     History reviewed. No pertinent past medical history.     Past Surgical History     Past Surgical History:   Procedure Laterality Date    ARM SURGERY  2020    right bicep repair    COLONOSCOPY N/A 10/25/2019    COLONOSCOPY POLYPECTOMY SNARE/COLD BIOPSY performed by Dameon Cuba MD at [address] [place] [address] 165      inguinal       Family History     Family History   Problem Relation Age of Onset    Diabetes Mother     Cancer Father         colon    Diabetes Father     Diabetes Brother        Social History     Social History     Tobacco Use    Smoking status: Former     Packs/day: 1.00     Years: 5.00     Pack years: 5.00     Types: Cigarettes     Quit date: 1994     Years since quittin.1    Smokeless tobacco: Current     Types: Snuff   Vaping Use    Vaping Use: Never used   Substance Use Topics    Alcohol use: No    Drug use: No        Allergies     Allergies   Allergen Reactions    Other     Percocet [Oxycodone-Acetaminophen] Itching       Medications     Current Outpatient Medications   Medication Sig Dispense Refill    vitamin C (ASCORBIC ACID) 500 MG tablet Take 500 mg by mouth daily      Multiple Vitamins-Minerals (MULTIVITAMIN ADULT PO) Take 1 tablet by mouth daily       No current facility-administered medications for this visit.       Review of Systems     REVIEW OF SYSTEMS    See HPI Above    PhysicalExam     Vitals:    03/03/23 1106   BP: 112/74   Pulse: 94   Temp: 97.8 °F (36.6 °C)   TempSrc: Temporal   SpO2: 96%       PHYSICAL EXAM  /74   Pulse 94   Temp 97.8 °F (36.6 °C) (Temporal)   SpO2 96%     GENERAL: No acute distress, alert and oriented  EYES: EOMI, Anti-icteric  NOSE: On anterior rhinoscopy there is no epistaxis, nasal mucosa moist and normal appearing, no purulent drainage.   EARS: Normal external appearance; on portable otomicroscopy:     -Ad: External auditory canal without stenosis, tympanic membrane clear, no middle ear effusions or retractions.      -As: External auditory canal without stenosis, tympanic membrane clear, no middle ear effusions or retractions.   Pneumatic otoscopy: Bilateral tympanic membranes mobile pneumatic otoscopy  FACE: HB 1/6 bilaterally, symmetric appearing, sensation equal bilaterally  ORAL CAVITY: There is light leukoplakia along the left gingivobuccal sulcus and along the left mandibular mucosa where he has a missing molar tooth, see picture below.  No exophytic masses or areas of ulceration or erythroplakia.  Uvula is midline, moist mucous membranes, no oropharyngeal masses or oropharyngeal obstruction. Tonsils 2+.      NECK: Normal range of motion, no thyromegaly, trachea is midline, no palpable lymphadenopathy or neck masses, no crepitus  NEURO: Cranial Nerves 2, 3, 4, 5, 6, 7, 11, 12 grossly intact bilaterally     I have performed a head and neck physical exam personally or was physically present during the key or critical portions of the service.    Assessment and Plan     1. Oral leukoplakia  2. Former smokeless tobacco use  -Discussed with patient the significance of leukoplakia especially in the setting of smokeless tobacco use as these could be precancerous lesion.   Fortunately he has stopped using smokeless tobacco approximately 1 month ago after long-term use. We will see if his leukoplakia resolves spontaneously over the course of the next couple months. Recommend warm salt water gargles daily. He will follow-up in 2 months for reevaluation of these lesions. If still present can consider biopsy at that time. Follow Up     Return in about 2 months (around 5/3/2023). Kayleen Trejo   Department of Otolaryngology/Head & Neck Surgery  3/4/23    Medical Decision Making: The following items were considered in medical decision making:  Independent review of images  Review / order clinical lab tests  Review / order radiology tests  Decision to obtain old records    This note was generated completely or in part utilizing Dragon dictation speech recognition software. Occasionally, words are mistranscribed and despite editing, the text may contain inaccuracies due to incorrect word recognition. If further clarification is needed please contact the office at [phone].

## 2023-03-19 LAB
A/G RATIO: 1.9 (ref 1.2–2.2)
ALBUMIN SERPL-MCNC: 4.4 G/DL (ref 3.8–4.9)
ALP BLD-CCNC: 84 IU/L (ref 44–121)
ALT SERPL-CCNC: 27 IU/L (ref 0–44)
AMBIGUOUS ABBREVIATION: NORMAL
AMBIGUOUS ABBREVIATION: NORMAL
AST SERPL-CCNC: 22 IU/L (ref 0–40)
BASOPHILS ABSOLUTE: 0 X10E3/UL (ref 0–0.2)
BASOPHILS RELATIVE PERCENT: 1 %
BILIRUB SERPL-MCNC: 0.5 MG/DL (ref 0–1.2)
BUN / CREAT RATIO: 23 (ref 9–20)
BUN BLDV-MCNC: 18 MG/DL (ref 6–24)
CALCIUM SERPL-MCNC: 9.4 MG/DL (ref 8.7–10.2)
CHLORIDE BLD-SCNC: 107 MMOL/L (ref 96–106)
CHOLESTEROL, TOTAL: 184 MG/DL (ref 100–199)
CO2: 23 MMOL/L (ref 20–29)
COMMENT: ABNORMAL
CREAT SERPL-MCNC: 0.78 MG/DL (ref 0.76–1.27)
EOSINOPHILS ABSOLUTE: 0.1 X10E3/UL (ref 0–0.4)
EOSINOPHILS RELATIVE PERCENT: 1 %
ERYTHROCYTES, NUCLEATED/100 LEU: NORMAL
ESTIMATED GLOMERULAR FILTRATION RATE CREATININE EQUATION: 105 ML/MIN/1.73
GLOBULIN: 2.3 G/DL (ref 1.5–4.5)
GLUCOSE BLD-MCNC: 95 MG/DL (ref 70–99)
HCT VFR BLD CALC: 45.5 % (ref 37.5–51)
HDLC SERPL-MCNC: 42 MG/DL
HEMOGLOBIN: 15.5 G/DL (ref 13–17.7)
IMMATURE CELLS ABSOLUTE COUNT: NORMAL
IMMATURE GRANS (ABS): 0 X10E3/UL (ref 0–0.1)
IMMATURE GRANULOCYTES: 0 %
LDL CHOLESTEROL CALCULATED: 117 MG/DL (ref 0–99)
LYMPHOCYTES ABSOLUTE: 1.7 X10E3/UL (ref 0.7–3.1)
LYMPHOCYTES RELATIVE PERCENT: 29 %
MCH RBC QN AUTO: 30.6 PG (ref 26.6–33)
MCHC RBC AUTO-ENTMCNC: 34.1 G/DL (ref 31.5–35.7)
MCV RBC AUTO: 90 FL (ref 79–97)
MONOCYTES ABSOLUTE: 0.4 X10E3/UL (ref 0.1–0.9)
MONOCYTES RELATIVE PERCENT: 7 %
MORPHOLOGY: NORMAL
NEUTROPHILS ABSOLUTE: 3.7 X10E3/UL (ref 1.4–7)
PDW BLD-RTO: 12.4 % (ref 11.6–15.4)
PLATELET # BLD: 221 X10E3/UL (ref 150–450)
POTASSIUM SERPL-SCNC: 4.3 MMOL/L (ref 3.5–5.2)
PROSTATE SPECIFIC ANTIGEN: 1.4 NG/ML (ref 0–4)
RBC # BLD: 5.07 X10E6/UL (ref 4.14–5.8)
SEGMENTED NEUTROPHILS RELATIVE PERCENT: 62 %
SODIUM BLD-SCNC: 142 MMOL/L (ref 134–144)
TOTAL PROTEIN: 6.7 G/DL (ref 6–8.5)
TRIGL SERPL-MCNC: 137 MG/DL (ref 0–149)
TSH SERPL DL<=0.05 MIU/L-ACNC: 1.31 UIU/ML (ref 0.45–4.5)
VLDLC SERPL CALC-MCNC: 25 MG/DL (ref 5–40)
WBC # BLD: 6 X10E3/UL (ref 3.4–10.8)

## 2023-05-05 ENCOUNTER — OFFICE VISIT (OUTPATIENT)
Dept: ENT CLINIC | Age: 58
End: 2023-05-05
Payer: COMMERCIAL

## 2023-05-05 VITALS
OXYGEN SATURATION: 96 % | BODY MASS INDEX: 27.82 KG/M2 | HEART RATE: 97 BPM | WEIGHT: 157 LBS | DIASTOLIC BLOOD PRESSURE: 71 MMHG | TEMPERATURE: 97.5 F | SYSTOLIC BLOOD PRESSURE: 113 MMHG | HEIGHT: 63 IN

## 2023-05-05 DIAGNOSIS — K13.21 ORAL LEUKOPLAKIA: Primary | ICD-10-CM

## 2023-05-05 DIAGNOSIS — Z87.891 FORMER SMOKELESS TOBACCO USE: ICD-10-CM

## 2023-05-05 PROCEDURE — 99213 OFFICE O/P EST LOW 20 MIN: CPT | Performed by: STUDENT IN AN ORGANIZED HEALTH CARE EDUCATION/TRAINING PROGRAM

## 2023-05-05 NOTE — PROGRESS NOTES
[address] VISIT      Patient Name: [address] [facility] [address] Record Number:  [id number]  Primary Care Physician:  RICARDO Cabezas CNP    ChiefComplaint     Chief Complaint   Patient presents with    Other     F/u mouth lesion,        History of Present Illness     Marely Winslow is an 66 y.o. male previously seen for oral leukoplakia, former smokeless tobacco use. Interval History:   No oral pain or bleeding. Has not used smokeless tobacco since last visit. Using vape pen every now and then. Past Medical History     No past medical history on file. Past Surgical History     Past Surgical History:   Procedure Laterality Date    ARM SURGERY  2020    right bicep repair    COLONOSCOPY N/A 10/25/2019    COLONOSCOPY POLYPECTOMY SNARE/COLD BIOPSY performed by Amanuel Ryan MD at [address] [place] [address] 165      inguinal       Family History     Family History   Problem Relation Age of Onset    Diabetes Mother     Cancer Father         colon    Diabetes Father     Diabetes Brother        Social History     Social History     Tobacco Use    Smoking status: Former     Packs/day: 1.00     Years: 5.00     Pack years: 5.00     Types: Cigarettes     Quit date: 1994     Years since quittin.3    Smokeless tobacco: Current     Types: Snuff   Vaping Use    Vaping Use: Never used   Substance Use Topics    Alcohol use: No    Drug use: No        Allergies     Allergies   Allergen Reactions    Other     Percocet [Oxycodone-Acetaminophen] Itching       Medications     Current Outpatient Medications   Medication Sig Dispense Refill    vitamin C (ASCORBIC ACID) 500 MG tablet Take 1 tablet by mouth daily      Multiple Vitamins-Minerals (MULTIVITAMIN ADULT PO) Take 1 tablet by mouth daily       No current facility-administered medications for this visit.        Review of Systems     REVIEW OF SYSTEM: See HPI above    PhysicalExam

## 2023-12-01 ENCOUNTER — OFFICE VISIT (OUTPATIENT)
Dept: ENT CLINIC | Age: 58
End: 2023-12-01
Payer: COMMERCIAL

## 2023-12-01 VITALS
TEMPERATURE: 98.6 F | DIASTOLIC BLOOD PRESSURE: 75 MMHG | SYSTOLIC BLOOD PRESSURE: 114 MMHG | WEIGHT: 157 LBS | HEART RATE: 67 BPM | HEIGHT: 63 IN | BODY MASS INDEX: 27.82 KG/M2 | OXYGEN SATURATION: 95 %

## 2023-12-01 DIAGNOSIS — K13.21 ORAL LEUKOPLAKIA: Primary | ICD-10-CM

## 2023-12-01 DIAGNOSIS — Z87.891 FORMER SMOKELESS TOBACCO USE: ICD-10-CM

## 2023-12-01 PROCEDURE — G8427 DOCREV CUR MEDS BY ELIG CLIN: HCPCS | Performed by: STUDENT IN AN ORGANIZED HEALTH CARE EDUCATION/TRAINING PROGRAM

## 2023-12-01 PROCEDURE — 3017F COLORECTAL CA SCREEN DOC REV: CPT | Performed by: STUDENT IN AN ORGANIZED HEALTH CARE EDUCATION/TRAINING PROGRAM

## 2023-12-01 PROCEDURE — G8484 FLU IMMUNIZE NO ADMIN: HCPCS | Performed by: STUDENT IN AN ORGANIZED HEALTH CARE EDUCATION/TRAINING PROGRAM

## 2023-12-01 PROCEDURE — 4004F PT TOBACCO SCREEN RCVD TLK: CPT | Performed by: STUDENT IN AN ORGANIZED HEALTH CARE EDUCATION/TRAINING PROGRAM

## 2023-12-01 PROCEDURE — 99212 OFFICE O/P EST SF 10 MIN: CPT | Performed by: STUDENT IN AN ORGANIZED HEALTH CARE EDUCATION/TRAINING PROGRAM

## 2023-12-01 PROCEDURE — G8419 CALC BMI OUT NRM PARAM NOF/U: HCPCS | Performed by: STUDENT IN AN ORGANIZED HEALTH CARE EDUCATION/TRAINING PROGRAM

## 2023-12-01 NOTE — PROGRESS NOTES
[facility] [address] VISIT      Patient Name: [address] Record Number:  [id number]  Primary Care Physician:  RICARDO Tavarez - CNP    ChiefComplaint     Chief Complaint   Patient presents with    Follow-up     Mouth lesion        History of Present Illness     Linda Hernandez is an 66 y.o. male previously seen for oral leukoplakia, former smokeless tobacco use. Interval History:   No longer using chewing tobacco.  He does use a vape pen every now and then. No oral pain or bleeding. No other head or neck symptoms. Past Medical History     No past medical history on file. Past Surgical History     Past Surgical History:   Procedure Laterality Date    ARM SURGERY  2020    right bicep repair    COLONOSCOPY N/A 10/25/2019    COLONOSCOPY POLYPECTOMY SNARE/COLD BIOPSY performed by Jacque Billy MD at [address]      inguinal       Family History     Family History   Problem Relation Age of Onset    Diabetes Mother     Cancer Father         colon    Diabetes Father     Diabetes Brother        Social History     Social History     Tobacco Use    Smoking status: Former     Packs/day: 1.00     Years: 5.00     Additional pack years: 0.00     Total pack years: 5.00     Types: Cigarettes     Quit date: 1994     Years since quittin.9    Smokeless tobacco: Current     Types: Snuff   Vaping Use    Vaping Use: Never used   Substance Use Topics    Alcohol use: No    Drug use: No        Allergies     Allergies   Allergen Reactions    Other     Percocet [Oxycodone-Acetaminophen] Itching       Medications     Current Outpatient Medications   Medication Sig Dispense Refill    vitamin C (ASCORBIC ACID) 500 MG tablet Take 1 tablet by mouth daily      Multiple Vitamins-Minerals (MULTIVITAMIN ADULT PO) Take 1 tablet by mouth daily       No current facility-administered medications for this visit.        Review of

## 2024-02-16 ENCOUNTER — OFFICE VISIT (OUTPATIENT)
Dept: FAMILY MEDICINE CLINIC | Age: 59
End: 2024-02-16
Payer: COMMERCIAL

## 2024-02-16 VITALS
OXYGEN SATURATION: 97 % | DIASTOLIC BLOOD PRESSURE: 70 MMHG | TEMPERATURE: 98.1 F | HEIGHT: 63 IN | SYSTOLIC BLOOD PRESSURE: 102 MMHG | BODY MASS INDEX: 28.46 KG/M2 | WEIGHT: 160.6 LBS | HEART RATE: 78 BPM

## 2024-02-16 DIAGNOSIS — B96.89 ACUTE BACTERIAL SINUSITIS: ICD-10-CM

## 2024-02-16 DIAGNOSIS — J01.90 ACUTE BACTERIAL SINUSITIS: ICD-10-CM

## 2024-02-16 DIAGNOSIS — E78.41 ELEVATED LIPOPROTEIN(A): ICD-10-CM

## 2024-02-16 DIAGNOSIS — Z00.00 ANNUAL PHYSICAL EXAM: Primary | ICD-10-CM

## 2024-02-16 DIAGNOSIS — Z80.0 FAMILY HISTORY OF COLON CANCER IN FATHER: ICD-10-CM

## 2024-02-16 DIAGNOSIS — K13.21 ORAL LEUKOPLAKIA: ICD-10-CM

## 2024-02-16 DIAGNOSIS — Z12.11 SCREENING FOR MALIGNANT NEOPLASM OF COLON: ICD-10-CM

## 2024-02-16 DIAGNOSIS — Z12.5 SCREENING FOR MALIGNANT NEOPLASM OF PROSTATE: ICD-10-CM

## 2024-02-16 DIAGNOSIS — Z13.220 SCREENING FOR HYPERLIPIDEMIA: ICD-10-CM

## 2024-02-16 DIAGNOSIS — Z13.1 SCREENING FOR DIABETES MELLITUS: ICD-10-CM

## 2024-02-16 PROCEDURE — G8484 FLU IMMUNIZE NO ADMIN: HCPCS | Performed by: NURSE PRACTITIONER

## 2024-02-16 PROCEDURE — 99396 PREV VISIT EST AGE 40-64: CPT | Performed by: NURSE PRACTITIONER

## 2024-02-16 RX ORDER — DOXYCYCLINE HYCLATE 100 MG
100 TABLET ORAL 2 TIMES DAILY
Qty: 20 TABLET | Refills: 0 | Status: SHIPPED | OUTPATIENT
Start: 2024-02-16 | End: 2024-02-26

## 2024-02-16 NOTE — PROGRESS NOTES
History and Physical      Richard Godinez  YOB: 1965    Date of Service:  2/16/2024    Chief Complaint:   Richard Godinez is a 58 y.o. male who presents for complete physical examination.    HPI: Presents to clinic today for annual physical exam and follow up on chronic health conditions.    Oral leukoplakia  Was monitored by ENT.  Things have improved with discontinuing use of smokeless tobacco.      Continues to vape on occasion - does have nicotine in it.     Concerned today in regards to sinus congestion/drainage along with chest congestion/coughing that started approximately one week prior.  Feels it got a little better, but symptoms have returned over the past few days.  Reports sinus drainage and sputum is green/yellow.  Denies any SOB or wheezing.  No recent sick contacts.     Diet: Poor - doesn't eat fruits and vegetables daily.  Doesn't limit junk and sugar.  Exercise: no regular exercise - stays active at work.   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  Enjoys jobs in general.  Likes co-workers.   Hobbies: Motorcycle, corn hole.    Family life: , 1 child, 3 step children, 7 grandchildren - gets to spend time with them.  Patients daughter lives in [place] - gets to visit on occasion. 1 dog, lives in house.  Low stress.  No concerns for anxiety or depression.   Last eye exam: Many years - wears reading glasses.    Last dental exam: 2024    Preventive Care:  Health Maintenance   Topic Date Due    Hepatitis B vaccine (1 of 3 - 3-dose series) Never done    Hepatitis C screen  Never done    Shingles vaccine (1 of 2) Never done    Flu vaccine (1) 08/01/2023    COVID-19 Vaccine (4 - 2023-24 season) 09/01/2023    Depression Screen  02/03/2024    Lipids  03/18/2028    DTaP/Tdap/Td vaccine (3 - Td or Tdap) 07/12/2029    Colorectal Cancer Screen  10/25/2029    HIV screen  Completed    Hepatitis A vaccine  Aged Out    Hib vaccine  Aged

## 2024-12-13 ENCOUNTER — OFFICE VISIT (OUTPATIENT)
Dept: FAMILY MEDICINE CLINIC | Age: 59
End: 2024-12-13

## 2024-12-13 VITALS
BODY MASS INDEX: 28.7 KG/M2 | HEART RATE: 63 BPM | SYSTOLIC BLOOD PRESSURE: 100 MMHG | OXYGEN SATURATION: 98 % | TEMPERATURE: 98.2 F | DIASTOLIC BLOOD PRESSURE: 68 MMHG | WEIGHT: 162 LBS

## 2024-12-13 DIAGNOSIS — L73.9 FOLLICULITIS: Primary | ICD-10-CM

## 2024-12-13 DIAGNOSIS — Z23 NEED FOR INFLUENZA VACCINATION: ICD-10-CM

## 2024-12-13 RX ORDER — CLOTRIMAZOLE AND BETAMETHASONE DIPROPIONATE 10; .64 MG/G; MG/G
CREAM TOPICAL
Qty: 45 G | Refills: 0 | Status: SHIPPED | OUTPATIENT
Start: 2024-12-13

## 2024-12-13 RX ORDER — DOXYCYCLINE HYCLATE 100 MG
100 TABLET ORAL 2 TIMES DAILY
Qty: 20 TABLET | Refills: 0 | Status: SHIPPED | OUTPATIENT
Start: 2024-12-13 | End: 2024-12-23

## 2024-12-13 NOTE — PROGRESS NOTES
SUBJECTIVE:    Richard Godinez is a 59 y.o. male who presents for a follow up visit.    Chief Complaint   Patient presents with    Rash     3w itchy rash         Rash  This is a new problem. The current episode started 1 to 4 weeks ago. The problem has been waxing and waning since onset. The affected locations include the left buttock and right buttock. The rash is characterized by redness and itchiness. He was exposed to nothing. Past treatments include antibiotic cream, topical steroids and moisturizer. The treatment provided no relief.        Patient's medications, allergies, past medical,surgical, social and family histories were reviewed and updated as appropriate.     No past medical history on file.  Past Surgical History:   Procedure Laterality Date    ARM SURGERY  2020    right bicep repair    COLONOSCOPY N/A 10/25/2019    COLONOSCOPY POLYPECTOMY SNARE/COLD BIOPSY performed by Favio Danielle MD at [facility] ENDOSCOPY    HERNIA REPAIR      inguinal     Family History   Problem Relation Age of Onset    Diabetes Mother     Cancer Father         colon    Diabetes Father     Diabetes Brother      Social History     Tobacco Use    Smoking status: Former     Current packs/day: 0.00     Average packs/day: 1 pack/day for 5.0 years (5.0 ttl pk-yrs)     Types: Cigarettes     Start date: 1989     Quit date: 1994     Years since quittin.9    Smokeless tobacco: Former     Types: Snuff   Substance Use Topics    Alcohol use: No      Allergies   Allergen Reactions    Other     Percocet [Oxycodone-Acetaminophen] Itching     Current Outpatient Medications on File Prior to Visit   Medication Sig Dispense Refill    Multiple Vitamins-Minerals (MULTIVITAMIN ADULT PO) Take 1 tablet by mouth daily       No current facility-administered medications on file prior to visit.        Review of Systems   Skin:  Positive for rash.       OBJECTIVE:    /68   Pulse 63   Temp 98.2 °F (36.8 °C) (Temporal)   Wt 73.5 kg

## 2025-03-28 ENCOUNTER — OFFICE VISIT (OUTPATIENT)
Dept: FAMILY MEDICINE CLINIC | Age: 60
End: 2025-03-28
Payer: COMMERCIAL

## 2025-03-28 VITALS
DIASTOLIC BLOOD PRESSURE: 78 MMHG | TEMPERATURE: 97.5 F | SYSTOLIC BLOOD PRESSURE: 112 MMHG | RESPIRATION RATE: 12 BRPM | BODY MASS INDEX: 28.25 KG/M2 | WEIGHT: 159.5 LBS | OXYGEN SATURATION: 99 % | HEART RATE: 61 BPM

## 2025-03-28 DIAGNOSIS — Z12.5 SCREENING FOR MALIGNANT NEOPLASM OF PROSTATE: ICD-10-CM

## 2025-03-28 DIAGNOSIS — Z13.6 SCREENING FOR CARDIOVASCULAR CONDITION: ICD-10-CM

## 2025-03-28 DIAGNOSIS — Z00.00 ANNUAL PHYSICAL EXAM: Primary | ICD-10-CM

## 2025-03-28 DIAGNOSIS — K13.21 ORAL LEUKOPLAKIA: ICD-10-CM

## 2025-03-28 DIAGNOSIS — Z13.220 SCREENING FOR HYPERLIPIDEMIA: ICD-10-CM

## 2025-03-28 DIAGNOSIS — Z13.1 SCREENING FOR DIABETES MELLITUS: ICD-10-CM

## 2025-03-28 DIAGNOSIS — E78.41 ELEVATED LIPOPROTEIN(A): ICD-10-CM

## 2025-03-28 PROCEDURE — 99396 PREV VISIT EST AGE 40-64: CPT | Performed by: NURSE PRACTITIONER

## 2025-03-28 SDOH — ECONOMIC STABILITY: FOOD INSECURITY: WITHIN THE PAST 12 MONTHS, YOU WORRIED THAT YOUR FOOD WOULD RUN OUT BEFORE YOU GOT MONEY TO BUY MORE.: NEVER TRUE

## 2025-03-28 SDOH — ECONOMIC STABILITY: FOOD INSECURITY: WITHIN THE PAST 12 MONTHS, THE FOOD YOU BOUGHT JUST DIDN'T LAST AND YOU DIDN'T HAVE MONEY TO GET MORE.: NEVER TRUE

## 2025-03-28 ASSESSMENT — PATIENT HEALTH QUESTIONNAIRE - PHQ9
1. LITTLE INTEREST OR PLEASURE IN DOING THINGS: NOT AT ALL
SUM OF ALL RESPONSES TO PHQ QUESTIONS 1-9: 0
2. FEELING DOWN, DEPRESSED OR HOPELESS: NOT AT ALL
SUM OF ALL RESPONSES TO PHQ QUESTIONS 1-9: 0

## 2025-04-19 LAB
ALBUMIN: 4.4 G/DL (ref 3.8–4.9)
ALP BLD-CCNC: 86 IU/L (ref 44–121)
ALT SERPL-CCNC: 28 IU/L (ref 0–44)
AST SERPL-CCNC: 26 IU/L (ref 0–40)
BASOPHILS ABSOLUTE: 0 X10E3/UL (ref 0–0.2)
BASOPHILS RELATIVE PERCENT: 1 %
BILIRUB SERPL-MCNC: 0.6 MG/DL (ref 0–1.2)
BUN / CREAT RATIO: 19 (ref 9–20)
BUN BLDV-MCNC: 15 MG/DL (ref 6–24)
CALCIUM SERPL-MCNC: 9.6 MG/DL (ref 8.7–10.2)
CHLORIDE BLD-SCNC: 104 MMOL/L (ref 96–106)
CHOLESTEROL, TOTAL: 183 MG/DL (ref 100–199)
CO2: 23 MMOL/L (ref 20–29)
CREAT SERPL-MCNC: 0.81 MG/DL (ref 0.76–1.27)
EOSINOPHILS ABSOLUTE: 0.1 X10E3/UL (ref 0–0.4)
EOSINOPHILS RELATIVE PERCENT: 3 %
ESTIMATED GLOMERULAR FILTRATION RATE CREATININE EQUATION: 102 ML/MIN/1.73
GLOBULIN: 2.3 G/DL (ref 1.5–4.5)
GLUCOSE BLD-MCNC: 95 MG/DL (ref 70–99)
HCT VFR BLD CALC: 44.4 % (ref 37.5–51)
HDLC SERPL-MCNC: 44 MG/DL
HEMOGLOBIN: 14.9 G/DL (ref 13–17.7)
IMMATURE GRANS (ABS): 0 X10E3/UL (ref 0–0.1)
IMMATURE GRANULOCYTES %: 0 %
LDL CHOLESTEROL: 120 MG/DL (ref 0–99)
LYMPHOCYTES ABSOLUTE: 1.7 X10E3/UL (ref 0.7–3.1)
LYMPHOCYTES RELATIVE PERCENT: 29 %
MCH RBC QN AUTO: 31.8 PG (ref 26.6–33)
MCHC RBC AUTO-ENTMCNC: 33.6 G/DL (ref 31.5–35.7)
MCV RBC AUTO: 95 FL (ref 79–97)
MONOCYTES ABSOLUTE: 0.5 X10E3/UL (ref 0.1–0.9)
MONOCYTES RELATIVE PERCENT: 9 %
NEUTROPHILS ABSOLUTE: 3.3 X10E3/UL (ref 1.4–7)
NEUTROPHILS RELATIVE PERCENT: 58 %
PDW BLD-RTO: 12.7 % (ref 11.6–15.4)
PLATELET # BLD: 245 X10E3/UL (ref 150–450)
POTASSIUM SERPL-SCNC: 4.4 MMOL/L (ref 3.5–5.2)
PROSTATE SPECIFIC ANTIGEN: 1.8 NG/ML (ref 0–4)
RBC # BLD: 4.69 X10E6/UL (ref 4.14–5.8)
SODIUM BLD-SCNC: 140 MMOL/L (ref 134–144)
TOTAL PROTEIN: 6.7 G/DL (ref 6–8.5)
TRIGL SERPL-MCNC: 106 MG/DL (ref 0–149)
VLDLC SERPL CALC-MCNC: 19 MG/DL (ref 5–40)
WBC # BLD: 5.7 X10E3/UL (ref 3.4–10.8)

## 2025-04-22 ENCOUNTER — RESULTS FOLLOW-UP (OUTPATIENT)
Dept: FAMILY MEDICINE CLINIC | Age: 60
End: 2025-04-22

## (undated) DEVICE — Device: Brand: DISPOSABLE ELECTROSURGICAL SNARE

## (undated) DEVICE — SET VLV 3 PC AWS DISPOSABLE GRDIAN SCOPEVALET

## (undated) DEVICE — BW-412T DISP COMBO CLEANING BRUSH: Brand: SINGLE USE COMBINATION CLEANING BRUSH

## (undated) DEVICE — PROCEDURE KIT ENDOSCP CUST

## (undated) DEVICE — TRAP SPEC RETRV CLR PLAS POLYP IN LN SUCT QUIK CTCH

## (undated) DEVICE — SOLUTION IV IRRIG WATER 500ML POUR BRL ST 2F7113

## (undated) DEVICE — 60 ML SYRINGE,REGULAR TIP: Brand: MONOJECT